# Patient Record
Sex: MALE | Race: WHITE
[De-identification: names, ages, dates, MRNs, and addresses within clinical notes are randomized per-mention and may not be internally consistent; named-entity substitution may affect disease eponyms.]

---

## 2020-03-14 ENCOUNTER — HOSPITAL ENCOUNTER (EMERGENCY)
Age: 40
LOS: 1 days | Discharge: HOME | End: 2020-03-15
Payer: COMMERCIAL

## 2020-03-14 PROCEDURE — 99284 EMERGENCY DEPT VISIT MOD MDM: CPT

## 2020-03-14 PROCEDURE — 83690 ASSAY OF LIPASE: CPT

## 2020-03-14 PROCEDURE — 81001 URINALYSIS AUTO W/SCOPE: CPT

## 2020-03-14 PROCEDURE — 96375 TX/PRO/DX INJ NEW DRUG ADDON: CPT

## 2020-03-14 PROCEDURE — 85025 COMPLETE CBC W/AUTO DIFF WBC: CPT

## 2020-03-14 PROCEDURE — 36415 COLL VENOUS BLD VENIPUNCTURE: CPT

## 2020-03-14 PROCEDURE — 96361 HYDRATE IV INFUSION ADD-ON: CPT

## 2020-03-14 PROCEDURE — 82150 ASSAY OF AMYLASE: CPT

## 2020-03-14 PROCEDURE — 96376 TX/PRO/DX INJ SAME DRUG ADON: CPT

## 2020-03-14 PROCEDURE — 80053 COMPREHEN METABOLIC PANEL: CPT

## 2020-03-14 PROCEDURE — 96374 THER/PROPH/DIAG INJ IV PUSH: CPT

## 2020-03-14 PROCEDURE — 74177 CT ABD & PELVIS W/CONTRAST: CPT

## 2022-07-02 ENCOUNTER — HOSPITAL ENCOUNTER (EMERGENCY)
Dept: HOSPITAL 47 - EC | Age: 42
Discharge: HOME | End: 2022-07-02
Payer: COMMERCIAL

## 2022-07-02 VITALS
DIASTOLIC BLOOD PRESSURE: 110 MMHG | SYSTOLIC BLOOD PRESSURE: 155 MMHG | HEART RATE: 89 BPM | TEMPERATURE: 98.5 F | RESPIRATION RATE: 16 BRPM

## 2022-07-02 DIAGNOSIS — M10.9: Primary | ICD-10-CM

## 2022-07-02 DIAGNOSIS — Z87.891: ICD-10-CM

## 2022-07-02 DIAGNOSIS — F32.A: ICD-10-CM

## 2022-07-02 DIAGNOSIS — Z79.899: ICD-10-CM

## 2022-07-02 PROCEDURE — 99283 EMERGENCY DEPT VISIT LOW MDM: CPT

## 2022-07-02 NOTE — ED
Extremity Problem HPI





- General


Chief complaint: Extremity Problem,Nontraumatic


Stated complaint: RT toe pain and swelling


Time Seen by Provider: 22 04:13


Source: patient


Mode of arrival: wheelchair


Limitations: no limitations





- History of Present Illness


Initial comments: 





Patient's 42-year-old man complaining of right first toe pain.  Patient noted 

that it had started yesterday getting progressively worse this morning.  He did 

not have any injury.  He also is having redness warmth and swelling.  He 

indicates that started at the first MTP joint and then now is involving the 

entire first toe.  No discharge.  No fever or chills.  No other systemic 

symptoms.


MD Complaint: extremity pain, extremity swelling


Onset/Timin


-: days(s)


Location: right, toe


History of Same: No


-: Yes arthralgia


Radiation: none


Quality: aching


Consistency: constant


Improves with: nothing


Worsens with: weight bearing, walking, palpation


Associated Symptoms: denies other symptoms





- Related Data


                                  Previous Rx's











 Medication  Instructions  Recorded


 


HYDROcodone/APAP 7.5-325MG [Norco 1 tab PO Q6HR PRN 3 Days #12 tab 20





7.5-325]  


 


Ondansetron Odt [Zofran Odt] 4 mg PO Q8HR PRN #30 tab 20


 


Tamsulosin [Flomax] 0.4 mg PO DAILY #7 cap 20


 


Colchicine 0.6 mg PO Q1H PRN #12 capsule 22


 


HYDROcodone/APAP 5-325MG [Norco 1 tab PO Q6HR PRN 3 Days #12 tab 22





5-325]  


 


Indomethacin [Indocin] 50 mg PO TID #12 capsule 22


 


predniSONE 60 mg PO DAILY #30 tab 22











                                    Allergies











Allergy/AdvReac Type Severity Reaction Status Date / Time


 


No Known Allergies Allergy   Verified 22 04:03














Review of Systems


ROS Statement: 


Those systems with pertinent positive or pertinent negative responses have been 

documented in the HPI.





ROS Other: All systems not noted in ROS Statement are negative.


Constitutional: Denies: fever, chills


Respiratory: Denies: cough, dyspnea


Cardiovascular: Denies: chest pain, palpitations, edema


Gastrointestinal: Denies: abdominal pain, nausea, vomiting


Genitourinary: Denies: dysuria, hematuria


Musculoskeletal: Reports: as per HPI, joint swelling, arthralgia.  Denies: back 

pain


Skin: Denies: rash, lesions





Past Medical History


Past Medical History: No Reported History


History of Any Multi-Drug Resistant Organisms: None Reported


Past Surgical History: No Surgical Hx Reported


Past Psychological History: Depression


Smoking Status: Former smoker


Past Alcohol Use History: Occasional


Past Drug Use History: None Reported





General Exam


Limitations: no limitations





Course


                                   Vital Signs











  22





  04:03


 


Temperature 98.5 F


 


Pulse Rate 89


 


Respiratory 16





Rate 


 


Blood Pressure 155/110


 


O2 Sat by Pulse 98





Oximetry 














Disposition


Clinical Impression: 


 Gout





Disposition: HOME SELF-CARE


Condition: Good


Instructions (If sedation given, give patient instructions):  Low Purine Diet 

(ED), Gout (ED)


Prescriptions: 


Colchicine 0.6 mg PO Q1H PRN #12 capsule


 PRN Reason: Pain


Indomethacin [Indocin] 50 mg PO TID #12 capsule


HYDROcodone/APAP 5-325MG [Norco 5-325] 1 tab PO Q6HR PRN 3 Days #12 tab


 PRN Reason: Pain


predniSONE 60 mg PO DAILY #30 tab


Is patient prescribed a controlled substance at d/c from ED?: Yes


When asked, does pt state using other controlled substances?: No


If prescribed controlled substance>3 days was MAPS reviewed?: Prescribed <3 Days


If opioid is for acute pain is fill amount 7 days or less?: Yes


If Rx opioid, was Start Talking consent form obtained?: Yes


Referrals: 


Phil Johnson [STAFF PHYSICIAN] - 1-2 days

## 2022-08-06 ENCOUNTER — HOSPITAL ENCOUNTER (OUTPATIENT)
Dept: HOSPITAL 47 - RADXRMAIN | Age: 42
Discharge: HOME | End: 2022-08-06
Attending: FAMILY MEDICINE
Payer: COMMERCIAL

## 2022-08-06 DIAGNOSIS — M79.674: Primary | ICD-10-CM

## 2022-08-06 NOTE — XR
EXAMINATION TYPE: XR foot limited RT

 

DATE OF EXAM: 8/6/2022 9:28 AM

 

INDICATION: 

Patient age:Male;  42 years old; 

Reason for study: [id number]; [facility]. 

 

COMPARISON: None

 

TECHNIQUE: The right foot was examined in the AP, oand lateral projections.  

 

FINDINGS: 

No evidence of any acute osseous pathology.  No evidence of soft tissue swelling. Joints are preserve
d. Calcaneal plantar spurring is present.

 

IMPRESSION: 

No evidence of acute fracture.